# Patient Record
Sex: MALE | Race: WHITE
[De-identification: names, ages, dates, MRNs, and addresses within clinical notes are randomized per-mention and may not be internally consistent; named-entity substitution may affect disease eponyms.]

---

## 2017-02-22 ENCOUNTER — HOSPITAL ENCOUNTER (EMERGENCY)
Dept: HOSPITAL 62 - ER | Age: 57
Discharge: HOME | End: 2017-02-22
Payer: COMMERCIAL

## 2017-02-22 VITALS — DIASTOLIC BLOOD PRESSURE: 73 MMHG | SYSTOLIC BLOOD PRESSURE: 128 MMHG

## 2017-02-22 DIAGNOSIS — K21.9: ICD-10-CM

## 2017-02-22 DIAGNOSIS — I10: ICD-10-CM

## 2017-02-22 DIAGNOSIS — F41.0: Primary | ICD-10-CM

## 2017-02-22 DIAGNOSIS — E87.6: ICD-10-CM

## 2017-02-22 DIAGNOSIS — R20.0: ICD-10-CM

## 2017-02-22 LAB
ALBUMIN SERPL-MCNC: 4.3 G/DL (ref 3.5–5)
ALP SERPL-CCNC: 62 U/L (ref 38–126)
ALT SERPL-CCNC: 54 U/L (ref 21–72)
ANION GAP SERPL CALC-SCNC: 18 MMOL/L (ref 5–19)
APPEARANCE UR: CLEAR
AST SERPL-CCNC: 40 U/L (ref 17–59)
BASOPHILS # BLD AUTO: 0.1 10^3/UL (ref 0–0.2)
BASOPHILS NFR BLD AUTO: 1 % (ref 0–2)
BILIRUB DIRECT SERPL-MCNC: 0 MG/DL (ref 0–0.3)
BILIRUB SERPL-MCNC: 0.7 MG/DL (ref 0.2–1.3)
BILIRUB UR QL STRIP: NEGATIVE
BUN SERPL-MCNC: 11 MG/DL (ref 7–20)
CALCIUM: 9.6 MG/DL (ref 8.4–10.2)
CHLORIDE SERPL-SCNC: 94 MMOL/L (ref 98–107)
CO2 SERPL-SCNC: 27 MMOL/L (ref 22–30)
CREAT SERPL-MCNC: 1.09 MG/DL (ref 0.52–1.25)
EOSINOPHIL # BLD AUTO: 0.2 10^3/UL (ref 0–0.6)
EOSINOPHIL NFR BLD AUTO: 2.4 % (ref 0–6)
ERYTHROCYTE [DISTWIDTH] IN BLOOD BY AUTOMATED COUNT: 13.9 % (ref 11.5–14)
GLUCOSE SERPL-MCNC: 94 MG/DL (ref 75–110)
GLUCOSE UR STRIP-MCNC: NEGATIVE MG/DL
HCT VFR BLD CALC: 43 % (ref 37.9–51)
HGB BLD-MCNC: 15.1 G/DL (ref 13.5–17)
HGB HCT DIFFERENCE: 2.3
KETONES UR STRIP-MCNC: NEGATIVE MG/DL
LYMPHOCYTES # BLD AUTO: 2.2 10^3/UL (ref 0.5–4.7)
LYMPHOCYTES NFR BLD AUTO: 30.3 % (ref 13–45)
MCH RBC QN AUTO: 28.4 PG (ref 27–33.4)
MCHC RBC AUTO-ENTMCNC: 35.1 G/DL (ref 32–36)
MCV RBC AUTO: 81 FL (ref 80–97)
MONOCYTES # BLD AUTO: 0.6 10^3/UL (ref 0.1–1.4)
MONOCYTES NFR BLD AUTO: 8.4 % (ref 3–13)
NEUTROPHILS # BLD AUTO: 4.2 10^3/UL (ref 1.7–8.2)
NEUTS SEG NFR BLD AUTO: 57.9 % (ref 42–78)
NITRITE UR QL STRIP: NEGATIVE
PH UR STRIP: 7 [PH] (ref 5–9)
POTASSIUM SERPL-SCNC: 2.7 MMOL/L (ref 3.6–5)
PROT SERPL-MCNC: 7.7 G/DL (ref 6.3–8.2)
PROT UR STRIP-MCNC: NEGATIVE MG/DL
RBC # BLD AUTO: 5.31 10^6/UL (ref 4.35–5.55)
SODIUM SERPL-SCNC: 138.9 MMOL/L (ref 137–145)
SP GR UR STRIP: 1.01
UROBILINOGEN UR-MCNC: NEGATIVE MG/DL (ref ?–2)
WBC # BLD AUTO: 7.2 10^3/UL (ref 4–10.5)

## 2017-02-22 PROCEDURE — 85025 COMPLETE CBC W/AUTO DIFF WBC: CPT

## 2017-02-22 PROCEDURE — 99284 EMERGENCY DEPT VISIT MOD MDM: CPT

## 2017-02-22 PROCEDURE — 81001 URINALYSIS AUTO W/SCOPE: CPT

## 2017-02-22 PROCEDURE — 80053 COMPREHEN METABOLIC PANEL: CPT

## 2017-02-22 PROCEDURE — 36415 COLL VENOUS BLD VENIPUNCTURE: CPT

## 2017-02-22 NOTE — ER DOCUMENT REPORT
ED Medical Screen (RME)





- General


Stated Complaint: BODY NUMBNESS


Notes: 


55 yo male c/o numbness, feels cold.  mouth feels numb, hands feel numb.  

denies chest pain or shortness of breath.  + hx/o HTN, GERD. + increased stress 

recently, mother and sister are both in the hospital.  


pt neurologically intact.  no focal deficits appreciated


TRAVEL OUTSIDE OF THE [place] IN LAST 30 DAYS: No





Physical Exam





- Vital signs


Vitals: 





 











Temp Pulse Resp BP Pulse Ox


 


 98.3 F   81   20   145/96 H  100 


 


 02/22/17 16:59  02/22/17 16:59  02/22/17 16:59  02/22/17 16:59  02/22/17 16:59














Course





- Vital Signs


Vital signs: 





 











Temp Pulse Resp BP Pulse Ox


 


 98.3 F   81   20   145/96 H  100 


 


 02/22/17 16:59  02/22/17 16:59  02/22/17 16:59  02/22/17 16:59  02/22/17 16:59

## 2017-02-22 NOTE — ER DOCUMENT REPORT
ED General





- General


Chief Complaint: Numbness


Stated Complaint: BODY NUMBNESS


Notes: 


Patient is a 56-year-old male with past medical history of hypokalemia and 

hypertension as well as panic attacks who presents with feelings of diffuse 

body numbness and tingling.  States he's been under a great deal of stress 

lately and had an episode in which he felt numbness over his abdomen, around 

his mouth and hands.  This started while he was driving his vehicle to do 

multiple errands which he states were very stressful to him.  States he's had 

similar episodes in the past with panic.  His symptoms have resolved at the 

time of my assessment although he states he still feels very anxious and on 

edge.  Denies any chest pain, shortness of breath, nausea, vomiting, headache 

or neck pain.  He has not seen his primary care doctor regarding today's 

concerns.  Has not noted anything improves or worsens his symptoms.


TRAVEL OUTSIDE OF THE [place] IN LAST 30 DAYS: No





- Related Data


Allergies/Adverse Reactions: 


 





No Known Allergies Allergy (Unverified 02/22/17 17:10)


 











Past Medical History





- General


Information source: Patient





- Social History


Smoking Status: Never Smoker


Chew tobacco use (# tins/day): No


Frequency of alcohol use: None


Drug Abuse: None


Lives with: Spouse/Significant other


Family History: Reviewed & Not Pertinent


Patient has suicidal ideation: No


Patient has homicidal ideation: No





- Past Medical History


Cardiac Medical History: Reports: Hx Hypertension


Renal/ Medical History: Denies: Hx Peritoneal Dialysis


GI Medical History: Reports: Hx Gastroesophageal Reflux Disease


Past Surgical History: Reports: Hx Tonsillectomy





Review of Systems





- Review of Systems


Notes: 


Constitutional: Negative for fever.


HENT: Negative for sore throat.


Eyes: Negative for visual changes.


Cardiovascular: Negative for chest pain.


Respiratory: Negative for shortness of breath.


Gastrointestinal: Negative for abdominal pain, vomiting or diarrhea.


Genitourinary: Negative for dysuria.


Musculoskeletal: Negative for back pain.


Skin: Negative for rash.


Neurological: Negative for headaches, weakness or numbness.





10 point ROS negative except as marked above and in HPI.





Physical Exam





- Vital signs


Vitals: 


 











Temp Pulse Resp BP Pulse Ox


 


 98.3 F   81   20   145/96 H  100 


 


 02/22/17 16:59  02/22/17 16:59  02/22/17 16:59  02/22/17 16:59  02/22/17 16:59











Interpretation: Hypertensive


Notes: 


PHYSICAL EXAMINATION:





GENERAL: Well-appearing, well-nourished and in no acute distress.





HEAD: Atraumatic, normocephalic.





EYES: Pupils equal round and reactive to light, extraocular movements intact, 

sclera anicteric, conjunctiva are normal.





ENT: nares patent, oropharynx clear without exudates.  Moist mucous membranes.





NECK: Normal range of motion, supple without lymphadenopathy





LUNGS: Breath sounds clear to auscultation bilaterally and equal.  No wheezes 

rales or rhonchi.





HEART: Regular rate and rhythm without murmurs





ABDOMEN: Soft, nontender, normoactive bowel sounds.  No guarding, no rebound.  

No masses appreciated.





EXTREMITIES: Normal range of motion, no pitting or edema.  No cyanosis.





NEUROLOGICAL: No focal neurological deficits. Moves all extremities 

spontaneously and on command.





PSYCH: Normal mood, normal affect.





SKIN: Warm, Dry, normal turgor, no rashes or lesions noted.





Course





- Re-evaluation


Re-evalutation: 





02/22/17 21:37


Patient presents with history most consistent with an acute panic attack.  The 

patient admits that these are symptoms identical to prior occasions of panic.  

There was a clear trigger for tonight's episode.  Symptoms did resolve after 

receiving medical therapy here in the emergency department.  Vitals otherwise 

within normal limits.  Laboratories obtained in triage demonstrate hypokalemia 

which patient has a history.  I replaced his potassium and magnesium here in 

the emergency department but instructed him that he will need to follow closely 

as an outpatient for recheck of this laboratory.  I do not suspect an acute 

pulmonary embolus, ACS, pneumothorax, or any other acute left threatening 

pathology based on history and exam.  At this time will discharge with return 

precautions and follow-up recommendations.  Verbal discharge instructions given 

a the bedside and opportunity for questions given. Medication warnings 

reviewed. Patient is in agreement with this plan and has verbalized 

understanding of return precautions and the need for primary care follow-up in 

the next 24-72 hours.





- Vital Signs


Vital signs: 


 











Temp Pulse Resp BP Pulse Ox


 


 98.4 F   79   16   128/73 H  99 


 


 02/22/17 22:15  02/22/17 22:15  02/22/17 22:15  02/22/17 22:15  02/22/17 22:15














- Laboratory


Result Diagrams: 


 02/22/17 17:20





 02/22/17 17:20


Laboratory results interpreted by me: 


 











  02/22/17





  17:20


 


Potassium  2.7 L*


 


Chloride  94 L














Discharge





- Discharge


Clinical Impression: 


 Panic attack, Hypokalemia





Condition: Good


Disposition: HOME, SELF-CARE


Additional Instructions: 


You were seen today for symptoms that are most suggestive of a panic attack.  

The symptoms can come on without any clear trigger.  It is important that you 

follow-up with your primary care physician regarding anxiety and panic attacks 

as there are medications that can help prevent these attacks or stop them once 

they start.  Please return to the emergency department immediately if you began 

having chest pain, shortness of breath, vomiting, difficulty breathing, or if 

you are again having a panic attack.  Please also note that your potassium was 

very low today.  We replaced this here in the emergency department but you will 

need to follow-up with her primary care doctor in the next several days to her 

recheck of this lab.  Please also return if you have any additional symptoms 

that are concerning to you.

## 2018-01-30 ENCOUNTER — HOSPITAL ENCOUNTER (OUTPATIENT)
Dept: HOSPITAL 62 - FU | Age: 58
End: 2018-01-30
Attending: INTERNAL MEDICINE
Payer: COMMERCIAL

## 2018-01-30 DIAGNOSIS — R73.9: ICD-10-CM

## 2018-01-30 DIAGNOSIS — M10.9: ICD-10-CM

## 2018-01-30 DIAGNOSIS — E83.51: Primary | ICD-10-CM

## 2018-01-30 DIAGNOSIS — I10: ICD-10-CM

## 2018-01-30 DIAGNOSIS — E87.6: ICD-10-CM

## 2018-01-30 LAB
ADD MANUAL DIFF: NO
ALBUMIN SERPL-MCNC: 4.5 G/DL (ref 3.5–5)
ALP SERPL-CCNC: 54 U/L (ref 38–126)
ALT SERPL-CCNC: 49 U/L (ref 21–72)
ANION GAP SERPL CALC-SCNC: 8 MMOL/L (ref 5–19)
AST SERPL-CCNC: 30 U/L (ref 17–59)
BASOPHILS # BLD AUTO: 0.1 10^3/UL (ref 0–0.2)
BASOPHILS NFR BLD AUTO: 1 % (ref 0–2)
BILIRUB DIRECT SERPL-MCNC: 0.3 MG/DL (ref 0–0.4)
BILIRUB SERPL-MCNC: 0.7 MG/DL (ref 0.2–1.3)
BUN SERPL-MCNC: 12 MG/DL (ref 7–20)
CALCIUM: 10.3 MG/DL (ref 8.4–10.2)
CHLORIDE SERPL-SCNC: 103 MMOL/L (ref 98–107)
CO2 SERPL-SCNC: 29 MMOL/L (ref 22–30)
EOSINOPHIL # BLD AUTO: 0.2 10^3/UL (ref 0–0.6)
EOSINOPHIL NFR BLD AUTO: 3.4 % (ref 0–6)
ERYTHROCYTE [DISTWIDTH] IN BLOOD BY AUTOMATED COUNT: 14.6 % (ref 11.5–14)
FREE T4 (FREE THYROXINE): 1.22 NG/DL (ref 0.78–2.19)
GLUCOSE SERPL-MCNC: 81 MG/DL (ref 75–110)
HCT VFR BLD CALC: 43.2 % (ref 37.9–51)
HGB BLD-MCNC: 14.7 G/DL (ref 13.5–17)
LYMPHOCYTES # BLD AUTO: 1.4 10^3/UL (ref 0.5–4.7)
LYMPHOCYTES NFR BLD AUTO: 23.6 % (ref 13–45)
MCH RBC QN AUTO: 28.2 PG (ref 27–33.4)
MCHC RBC AUTO-ENTMCNC: 33.9 G/DL (ref 32–36)
MCV RBC AUTO: 83 FL (ref 80–97)
MONOCYTES # BLD AUTO: 0.5 10^3/UL (ref 0.1–1.4)
MONOCYTES NFR BLD AUTO: 8.7 % (ref 3–13)
NEUTROPHILS # BLD AUTO: 3.8 10^3/UL (ref 1.7–8.2)
NEUTS SEG NFR BLD AUTO: 63.3 % (ref 42–78)
PLATELET # BLD: 249 10^3/UL (ref 150–450)
POTASSIUM SERPL-SCNC: 4.7 MMOL/L (ref 3.6–5)
PROT SERPL-MCNC: 7.2 G/DL (ref 6.3–8.2)
RBC # BLD AUTO: 5.2 10^6/UL (ref 4.35–5.55)
SODIUM SERPL-SCNC: 140.1 MMOL/L (ref 137–145)
T3FREE SERPL-MCNC: 4.57 PG/ML (ref 2.77–5.27)
TOTAL CELLS COUNTED % (AUTO): 100 %
TSH SERPL-ACNC: 1.08 UIU/ML (ref 0.47–4.68)
URATE SERPL-MCNC: 8.3 MG/DL (ref 3.5–8.5)
WBC # BLD AUTO: 6 10^3/UL (ref 4–10.5)

## 2018-01-30 PROCEDURE — 85025 COMPLETE CBC W/AUTO DIFF WBC: CPT

## 2018-01-30 PROCEDURE — 84403 ASSAY OF TOTAL TESTOSTERONE: CPT

## 2018-01-30 PROCEDURE — 84481 FREE ASSAY (FT-3): CPT

## 2018-01-30 PROCEDURE — 84443 ASSAY THYROID STIM HORMONE: CPT

## 2018-01-30 PROCEDURE — 80053 COMPREHEN METABOLIC PANEL: CPT

## 2018-01-30 PROCEDURE — 80061 LIPID PANEL: CPT

## 2018-01-30 PROCEDURE — 84550 ASSAY OF BLOOD/URIC ACID: CPT

## 2018-01-30 PROCEDURE — 84439 ASSAY OF FREE THYROXINE: CPT

## 2018-01-30 PROCEDURE — 84436 ASSAY OF TOTAL THYROXINE: CPT

## 2018-01-31 LAB — T4 SERPL-MCNC: 5.7 UG/DL (ref 4.5–12)

## 2018-02-02 LAB
CHOLEST SERPL-MCNC: 177.7 MG/DL (ref 0–200)
LDLC SERPL DIRECT ASSAY-MCNC: 108 MG/DL (ref ?–100)
TRIGL SERPL-MCNC: 173 MG/DL (ref ?–150)
VLDLC SERPL CALC-MCNC: 34.6 MG/DL (ref 10–31)